# Patient Record
Sex: MALE | Race: WHITE | URBAN - NONMETROPOLITAN AREA
[De-identification: names, ages, dates, MRNs, and addresses within clinical notes are randomized per-mention and may not be internally consistent; named-entity substitution may affect disease eponyms.]

---

## 2017-08-09 ENCOUNTER — HISTORY (OUTPATIENT)
Dept: FAMILY MEDICINE | Facility: OTHER | Age: 72
End: 2017-08-09

## 2017-08-09 ENCOUNTER — OFFICE VISIT - GICH (OUTPATIENT)
Dept: FAMILY MEDICINE | Facility: OTHER | Age: 72
End: 2017-08-09

## 2017-08-09 DIAGNOSIS — S69.91XA UNSPECIFIED INJURY OF RIGHT WRIST, HAND AND FINGER(S), INITIAL ENCOUNTER: ICD-10-CM

## 2017-12-28 NOTE — PROGRESS NOTES
Patient Information     Patient Name MRN Sex Ovi Nelson [id number] Male 1945      Progress Notes by Franchesca Islas NP at 2017  4:00 PM     Author:  Franchesca Islas NP Service:  (none) Author Type:  PHYS- Nurse Practitioner     Filed:  2017  5:21 PM Encounter Date:  2017 Status:  Signed     :  Franchesca Islas NP (PHYS- Nurse Practitioner)        Procedure Orders:    1. REMOVAL OF FOREIGN BODY [[id number]] ordered by Franchesca Islas NP at 17 1711            Post-procedure Diagnoses:    1. Fish hook injury of hand, right, initial encounter [S69.91XA]               Nursing Notes:   Margarita Hurtado  2017  4:51 PM  Signed  Patient presents to clinic with fish hook in right hand.  Margarita Funez ....................  2017   4:29 PM        Lora Nick  2017  4:46 PM  Signed  lidocaine ordered by Franchesca Islas NP.  Medication administered per order in [facility]   Lot # [id number]  Exp.   NDC 54598-677-63  Patient tolerated well.  Lora Nick LPN..................2017  4:45 PM    SUBJECTIVE:    Ovi Arana is a 72 y.o. male who presents for Fish hook in his hand    Foreign Body   The incident occurred less than 1 hour ago. Suspected object: fish hook. Intake: RT hand. Causes for concern: Fishing and hook in the hand. Associated symptoms comments: No bleeding, no numbness or tingling in the thumb or hand.       Current Outpatient Prescriptions on File Prior to Visit       Medication  Sig Dispense Refill     aspirin 81 mg tablet Take 81 mg by mouth once daily with a meal.    Indications: MYOCARDIAL INFARCTION PREVENTION       ibuprofen (ADVIL; MOTRIN) 200 mg tablet Take 400 mg by mouth 4 times daily if needed.    Indications: PAIN       No current facility-administered medications on file prior to visit.        REVIEW OF SYSTEMS:  ROS    OBJECTIVE:  /84  Pulse 80  Temp 98.3  F (36.8  C) (Tympanic)  Ht 1.829 m (6')  Wt  108.5 kg (239 lb 3.2 oz)  BMI 32.44 kg/m2    EXAM:   Physical Exam   Constitutional: He is well-developed, well-nourished, and in no distress.   HENT:   Head: Normocephalic and atraumatic.   Eyes: Conjunctivae are normal.   Cardiovascular: Normal rate.    Pulmonary/Chest: Effort normal.   Musculoskeletal:        Right hand: He exhibits normal range of motion and no swelling. Normal sensation noted. Normal strength noted.   On the thumb web there is a tremble hook that has two hooks in his skin. Each area is anesthetized. [place] removed and then hand was washed with Hibiclens and dressed.     Neurological: He is alert.   Skin: Skin is warm and dry.   Tremble hook, 2 hooks in the RT hand thumb web space   Psychiatric: Mood and affect normal.   Nursing note and vitals reviewed.    FOREIGN BODY  Date/Time: 8/9/2017 5:00 PM  Performed by: LEONEL REED  Authorized by: LEONEL REED   Body area: skin  General location: upper extremity  Location details: right thumb  Anesthesia: local infiltration    Anesthesia:  Local Anesthetic: lidocaine 1% without epinephrine  Anesthetic total: 2 mL    Sedation:  Patient sedated: no  Patient cooperative: yes  Removal mechanism: hemostat  Dressing: dressing applied  Tendon involvement: superficial  Depth: subcutaneous  Complexity: simple  1 objects recovered.  Objects recovered: [place] fish hook, 2 hooks removed from skin  Post-procedure assessment: foreign body removed  Patient tolerance: Patient tolerated the procedure well with no immediate complications      ASSESSMENT/PLAN:    ICD-10-CM    1. Fish hook injury of hand, right, initial encounter S69.91XA         Plan:  Home cares and OTC gone over. Hook given back to patient intact. AVS gone over. I explained my diagnostic considerations and recommendations to the patient, who voiced understanding and agreement with the treatment plan. All questions were answered. We discussed potential side effects of any prescribed or  recommended therapies, as well as expectations for response to treatments. He was advised to contact our office if there is no improvement or worsening of conditions or symptoms.  If s/s worsen or persist, patient will either come back or follow up with PCP.       LEONEL REED NP ....................  8/9/2017   5:21 PM

## 2017-12-29 NOTE — PATIENT INSTRUCTIONS
Patient Information     Patient Name MRN Ovi Wilkins [id number] Male 1945      Patient Instructions by Franchesca Islas NP at 2017  4:00 PM     Author:  Franchesca Islas NP Service:  (none) Author Type:  PHYS- Nurse Practitioner     Filed:  2017  4:52 PM Encounter Date:  2017 Status:  Signed     :  Franchesca Islas NP (PHYS- Nurse Practitioner)            Fish hook - Soak the wound in warm water with antibacterial soap for 5-10 minutes at a time two to three times per day until healing is established.  Use antibiotic ointment on the wound 2 times daily.  You can keep it covered with a bandage in order to decrease infection concerns.     Watch for any signs of increasing infection (redness, pus, increased pain (may be along the tendon and back of hand if the hand involved), increased swelling or fever). Call if any of these signs occur. Monitor for fevers or chills.    Call or return to clinic as needed if your symptoms worsen or fail to improve as anticipated.

## 2017-12-30 NOTE — NURSING NOTE
Patient Information     Patient Name MRN Ovi Wilkins [id number] Male 1945      Nursing Note by Lora Nick at 2017  4:00 PM     Author:  Lora Nick Service:  (none) Author Type:  NURS- Student Practical Nurse     Filed:  2017  4:46 PM Encounter Date:  2017 Status:  Signed     :  Lora Nick (NURS- Student Practical Nurse)            lidocaine ordered by Franchesca Islas NP.  Medication administered per order in [facility]   Lot # [id number]  Exp.   NDC 25350-539-99  Patient tolerated well.  Lora Nick LPN..................2017  4:45 PM

## 2017-12-30 NOTE — NURSING NOTE
Patient Information     Patient Name MRN Ovi Wilkins [id number] Male 1945      Nursing Note by Margarita Hurtado at 2017  4:00 PM     Author:  Margarita Hurtado Service:  (none) Author Type:  (none)     Filed:  2017  4:51 PM Encounter Date:  2017 Status:  Signed     :  Margarita Hurtado            Patient presents to clinic with fish hook in right hand.  Margarita Funez ....................  2017   4:29 PM

## 2018-01-25 ENCOUNTER — DOCUMENTATION ONLY (OUTPATIENT)
Dept: FAMILY MEDICINE | Facility: OTHER | Age: 73
End: 2018-01-25

## 2018-01-25 RX ORDER — IBUPROFEN 200 MG
400 TABLET ORAL 4 TIMES DAILY PRN
COMMUNITY

## 2018-01-27 VITALS
WEIGHT: 239.2 LBS | BODY MASS INDEX: 32.4 KG/M2 | TEMPERATURE: 98.3 F | DIASTOLIC BLOOD PRESSURE: 84 MMHG | SYSTOLIC BLOOD PRESSURE: 130 MMHG | HEIGHT: 72 IN | HEART RATE: 80 BPM